# Patient Record
Sex: MALE | Race: WHITE | HISPANIC OR LATINO | URBAN - METROPOLITAN AREA
[De-identification: names, ages, dates, MRNs, and addresses within clinical notes are randomized per-mention and may not be internally consistent; named-entity substitution may affect disease eponyms.]

---

## 2020-08-10 ENCOUNTER — LAB OUTSIDE AN ENCOUNTER (OUTPATIENT)
Dept: URBAN - METROPOLITAN AREA CLINIC 115 | Facility: CLINIC | Age: 45
End: 2020-08-10

## 2020-08-10 ENCOUNTER — DASHBOARD ENCOUNTERS (OUTPATIENT)
Age: 45
End: 2020-08-10

## 2020-08-10 ENCOUNTER — OFFICE VISIT (OUTPATIENT)
Dept: URBAN - METROPOLITAN AREA CLINIC 115 | Facility: CLINIC | Age: 45
End: 2020-08-10
Payer: SELF-PAY

## 2020-08-10 DIAGNOSIS — R10.13 EPIGASTRIC PAIN: ICD-10-CM

## 2020-08-10 DIAGNOSIS — J02.9 SORE THROAT: ICD-10-CM

## 2020-08-10 DIAGNOSIS — R12 HEARTBURN: ICD-10-CM

## 2020-08-10 PROCEDURE — G8427 DOCREV CUR MEDS BY ELIG CLIN: HCPCS | Performed by: INTERNAL MEDICINE

## 2020-08-10 PROCEDURE — G9903 PT SCRN TBCO ID AS NON USER: HCPCS | Performed by: INTERNAL MEDICINE

## 2020-08-10 PROCEDURE — 99203 OFFICE O/P NEW LOW 30 MIN: CPT | Performed by: INTERNAL MEDICINE

## 2020-08-10 PROCEDURE — G8420 CALC BMI NORM PARAMETERS: HCPCS | Performed by: INTERNAL MEDICINE

## 2020-08-10 RX ORDER — BROMPHENIRAMIN/PSEUDOEPHEDRINE 1-15MG/5ML
1 APPLICATION AT BEDTIME LIQUID (ML) ORAL ONCE A DAY
Status: ACTIVE | COMMUNITY

## 2020-08-10 RX ORDER — METOCLOPRAMIDE 10 MG/1
1 TABLET BEFORE MEALS TABLET ORAL TWICE A DAY
Status: ACTIVE | COMMUNITY

## 2020-08-10 RX ORDER — OMEPRAZOLE 20 MG/1
1 CAPSULE 30 MINUTES BEFORE MORNING MEAL CAPSULE, DELAYED RELEASE ORAL ONCE A DAY
Status: ACTIVE | COMMUNITY

## 2020-08-10 RX ORDER — DEXLANSOPRAZOLE 60 MG/1
1 CAPSULE CAPSULE, DELAYED RELEASE ORAL ONCE A DAY
Qty: 30 | OUTPATIENT
Start: 2020-08-10

## 2020-08-10 RX ORDER — METHYLPREDNISOLONE 4 MG/1
1 TABLET WITH FOOD OR MILK TABLET ORAL
Status: ACTIVE | COMMUNITY

## 2020-08-10 RX ORDER — AZITHROMYCIN 250 MG/1
2 TABLET  ON THE FIRST DAY, THEN 1 TABLET DAILY FOR 4 DAYS TABLET, FILM COATED ORAL ONCE A DAY
Status: ACTIVE | COMMUNITY

## 2020-08-10 NOTE — HPI-TODAY'S VISIT:
Mr. Miles is a very pleasant 44-year-old  male who presents with symptoms of epigastric pain and heartburn. Mr. Miles states that approximately 14 years ago he was diagnosed with a hiatal hernia during an upper endoscopy.  He did not have an ulcer at that time.  He is really not given it much thought until recently.  He states that for the past 6 months he has been plagued by ear pain.  He is kind of dismissed his ear pain as he was busy working but while in [place] he had an evaluation.  They treated him with some antibiotics.  When he came back to the [facility] he had a slight fever saw his primary care doctor and had testing that was positive for Covid19.  This was approximately 1 month ago.  He has tested negative since then. Ever since he is been plagued by epigastric pain.  Is in a burning sensation that goes up into his chest and throat.  He has had chronic sore throat and actually saw an ENT who put him on 3 days of methylprednisolone.  He has been taking omeprazole 20 mg over-the-counter with no relief.

## 2020-08-25 ENCOUNTER — OFFICE VISIT (OUTPATIENT)
Dept: URBAN - METROPOLITAN AREA SURGERY CENTER 13 | Facility: SURGERY CENTER | Age: 45
End: 2020-08-25

## 2022-08-15 NOTE — PHYSICAL EXAM SKIN:
15-Aug-2022 18:36 no rashes , no suspicious lesions , no areas of discoloration , no jaundice present , good turgor ,